# Patient Record
Sex: MALE | Race: WHITE | NOT HISPANIC OR LATINO | Employment: OTHER | URBAN - METROPOLITAN AREA
[De-identification: names, ages, dates, MRNs, and addresses within clinical notes are randomized per-mention and may not be internally consistent; named-entity substitution may affect disease eponyms.]

---

## 2021-01-15 DIAGNOSIS — Z23 NEED FOR VACCINATION: ICD-10-CM

## 2022-04-12 ENCOUNTER — PRE-ADMISSION TESTING (OUTPATIENT)
Dept: ADMISSIONS | Facility: MEDICAL CENTER | Age: 82
End: 2022-04-12
Attending: NEUROLOGICAL SURGERY
Payer: MEDICARE

## 2022-04-12 ENCOUNTER — HOSPITAL ENCOUNTER (OUTPATIENT)
Dept: RADIOLOGY | Facility: MEDICAL CENTER | Age: 82
End: 2022-04-12
Attending: NEUROLOGICAL SURGERY | Admitting: NEUROLOGICAL SURGERY
Payer: MEDICARE

## 2022-04-12 DIAGNOSIS — Z01.812 PRE-PROCEDURAL LABORATORY EXAMINATION: ICD-10-CM

## 2022-04-12 DIAGNOSIS — Z01.811 PRE-PROCEDURAL RESPIRATORY EXAMINATION: ICD-10-CM

## 2022-04-12 DIAGNOSIS — Z01.810 PRE-PROCEDURAL CARDIOVASCULAR EXAMINATION: ICD-10-CM

## 2022-04-12 LAB
ANION GAP SERPL CALC-SCNC: 11 MMOL/L (ref 7–16)
APTT PPP: 26.9 SEC (ref 24.7–36)
BASOPHILS # BLD AUTO: 1.2 % (ref 0–1.8)
BASOPHILS # BLD: 0.11 K/UL (ref 0–0.12)
BUN SERPL-MCNC: 19 MG/DL (ref 8–22)
CALCIUM SERPL-MCNC: 8.9 MG/DL (ref 8.5–10.5)
CHLORIDE SERPL-SCNC: 104 MMOL/L (ref 96–112)
CO2 SERPL-SCNC: 23 MMOL/L (ref 20–33)
CREAT SERPL-MCNC: 1.24 MG/DL (ref 0.5–1.4)
EKG IMPRESSION: NORMAL
EOSINOPHIL # BLD AUTO: 0.3 K/UL (ref 0–0.51)
EOSINOPHIL NFR BLD: 3.3 % (ref 0–6.9)
ERYTHROCYTE [DISTWIDTH] IN BLOOD BY AUTOMATED COUNT: 46.6 FL (ref 35.9–50)
GFR SERPLBLD CREATININE-BSD FMLA CKD-EPI: 57 ML/MIN/1.73 M 2
GLUCOSE SERPL-MCNC: 91 MG/DL (ref 65–99)
HCT VFR BLD AUTO: 42 % (ref 42–52)
HGB BLD-MCNC: 13.1 G/DL (ref 14–18)
IMM GRANULOCYTES # BLD AUTO: 0.03 K/UL (ref 0–0.11)
IMM GRANULOCYTES NFR BLD AUTO: 0.3 % (ref 0–0.9)
INR PPP: 1.01 (ref 0.87–1.13)
LYMPHOCYTES # BLD AUTO: 3.09 K/UL (ref 1–4.8)
LYMPHOCYTES NFR BLD: 33.5 % (ref 22–41)
MCH RBC QN AUTO: 27.4 PG (ref 27–33)
MCHC RBC AUTO-ENTMCNC: 31.2 G/DL (ref 33.7–35.3)
MCV RBC AUTO: 87.9 FL (ref 81.4–97.8)
MONOCYTES # BLD AUTO: 0.78 K/UL (ref 0–0.85)
MONOCYTES NFR BLD AUTO: 8.5 % (ref 0–13.4)
NEUTROPHILS # BLD AUTO: 4.92 K/UL (ref 1.82–7.42)
NEUTROPHILS NFR BLD: 53.2 % (ref 44–72)
NRBC # BLD AUTO: 0 K/UL
NRBC BLD-RTO: 0 /100 WBC
PLATELET # BLD AUTO: 289 K/UL (ref 164–446)
PMV BLD AUTO: 11.4 FL (ref 9–12.9)
POTASSIUM SERPL-SCNC: 4.6 MMOL/L (ref 3.6–5.5)
PROTHROMBIN TIME: 13 SEC (ref 12–14.6)
RBC # BLD AUTO: 4.78 M/UL (ref 4.7–6.1)
SODIUM SERPL-SCNC: 138 MMOL/L (ref 135–145)
WBC # BLD AUTO: 9.2 K/UL (ref 4.8–10.8)

## 2022-04-12 PROCEDURE — 36415 COLL VENOUS BLD VENIPUNCTURE: CPT

## 2022-04-12 PROCEDURE — 85730 THROMBOPLASTIN TIME PARTIAL: CPT

## 2022-04-12 PROCEDURE — 93005 ELECTROCARDIOGRAM TRACING: CPT

## 2022-04-12 PROCEDURE — 85025 COMPLETE CBC W/AUTO DIFF WBC: CPT

## 2022-04-12 PROCEDURE — 71046 X-RAY EXAM CHEST 2 VIEWS: CPT

## 2022-04-12 PROCEDURE — 93010 ELECTROCARDIOGRAM REPORT: CPT | Performed by: INTERNAL MEDICINE

## 2022-04-12 PROCEDURE — 80048 BASIC METABOLIC PNL TOTAL CA: CPT

## 2022-04-12 PROCEDURE — 85610 PROTHROMBIN TIME: CPT

## 2022-04-12 RX ORDER — LOSARTAN POTASSIUM 50 MG/1
50 TABLET ORAL EVERY MORNING
COMMUNITY
Start: 2022-02-01

## 2022-04-12 RX ORDER — ACETAMINOPHEN 500 MG
500-1000 TABLET ORAL EVERY 6 HOURS PRN
Status: ON HOLD | COMMUNITY
End: 2022-04-20

## 2022-04-19 ENCOUNTER — ANESTHESIA (OUTPATIENT)
Dept: SURGERY | Facility: MEDICAL CENTER | Age: 82
End: 2022-04-19
Payer: MEDICARE

## 2022-04-19 ENCOUNTER — ANESTHESIA EVENT (OUTPATIENT)
Dept: SURGERY | Facility: MEDICAL CENTER | Age: 82
End: 2022-04-19
Payer: MEDICARE

## 2022-04-19 ENCOUNTER — APPOINTMENT (OUTPATIENT)
Dept: RADIOLOGY | Facility: MEDICAL CENTER | Age: 82
End: 2022-04-19
Attending: NEUROLOGICAL SURGERY
Payer: MEDICARE

## 2022-04-19 ENCOUNTER — HOSPITAL ENCOUNTER (OUTPATIENT)
Facility: MEDICAL CENTER | Age: 82
End: 2022-04-20
Attending: NEUROLOGICAL SURGERY | Admitting: NEUROLOGICAL SURGERY
Payer: MEDICARE

## 2022-04-19 PROBLEM — M48.061 LUMBAR STENOSIS WITHOUT NEUROGENIC CLAUDICATION: Status: ACTIVE | Noted: 2022-04-19

## 2022-04-19 LAB — PATHOLOGY CONSULT NOTE: NORMAL

## 2022-04-19 PROCEDURE — 160029 HCHG SURGERY MINUTES - 1ST 30 MINS LEVEL 4: Performed by: NEUROLOGICAL SURGERY

## 2022-04-19 PROCEDURE — G0378 HOSPITAL OBSERVATION PER HR: HCPCS

## 2022-04-19 PROCEDURE — A9270 NON-COVERED ITEM OR SERVICE: HCPCS | Performed by: NURSE PRACTITIONER

## 2022-04-19 PROCEDURE — 96376 TX/PRO/DX INJ SAME DRUG ADON: CPT

## 2022-04-19 PROCEDURE — 700101 HCHG RX REV CODE 250: Performed by: ANESTHESIOLOGY

## 2022-04-19 PROCEDURE — 700111 HCHG RX REV CODE 636 W/ 250 OVERRIDE (IP): Performed by: ANESTHESIOLOGY

## 2022-04-19 PROCEDURE — 72100 X-RAY EXAM L-S SPINE 2/3 VWS: CPT

## 2022-04-19 PROCEDURE — 501838 HCHG SUTURE GENERAL: Performed by: NEUROLOGICAL SURGERY

## 2022-04-19 PROCEDURE — 00630 ANES PX LUMBAR REGION NOS: CPT | Performed by: ANESTHESIOLOGY

## 2022-04-19 PROCEDURE — 700102 HCHG RX REV CODE 250 W/ 637 OVERRIDE(OP): Performed by: NURSE PRACTITIONER

## 2022-04-19 PROCEDURE — 96366 THER/PROPH/DIAG IV INF ADDON: CPT

## 2022-04-19 PROCEDURE — L8699 PROSTHETIC IMPLANT NOS: HCPCS | Performed by: NEUROLOGICAL SURGERY

## 2022-04-19 PROCEDURE — 88307 TISSUE EXAM BY PATHOLOGIST: CPT

## 2022-04-19 PROCEDURE — 160041 HCHG SURGERY MINUTES - EA ADDL 1 MIN LEVEL 4: Performed by: NEUROLOGICAL SURGERY

## 2022-04-19 PROCEDURE — 88311 DECALCIFY TISSUE: CPT

## 2022-04-19 PROCEDURE — 96367 TX/PROPH/DG ADDL SEQ IV INF: CPT

## 2022-04-19 PROCEDURE — 700102 HCHG RX REV CODE 250 W/ 637 OVERRIDE(OP): Performed by: ANESTHESIOLOGY

## 2022-04-19 PROCEDURE — 96375 TX/PRO/DX INJ NEW DRUG ADDON: CPT

## 2022-04-19 PROCEDURE — 88304 TISSUE EXAM BY PATHOLOGIST: CPT

## 2022-04-19 PROCEDURE — 160009 HCHG ANES TIME/MIN: Performed by: NEUROLOGICAL SURGERY

## 2022-04-19 PROCEDURE — 700105 HCHG RX REV CODE 258: Performed by: ANESTHESIOLOGY

## 2022-04-19 PROCEDURE — 110371 HCHG SHELL REV 272: Performed by: NEUROLOGICAL SURGERY

## 2022-04-19 PROCEDURE — A9270 NON-COVERED ITEM OR SERVICE: HCPCS | Performed by: ANESTHESIOLOGY

## 2022-04-19 PROCEDURE — 160035 HCHG PACU - 1ST 60 MINS PHASE I: Performed by: NEUROLOGICAL SURGERY

## 2022-04-19 PROCEDURE — C1894 INTRO/SHEATH, NON-LASER: HCPCS | Performed by: NEUROLOGICAL SURGERY

## 2022-04-19 PROCEDURE — 700105 HCHG RX REV CODE 258: Performed by: NEUROLOGICAL SURGERY

## 2022-04-19 PROCEDURE — 99100 ANES PT EXTEME AGE<1 YR&>70: CPT | Performed by: ANESTHESIOLOGY

## 2022-04-19 PROCEDURE — 700111 HCHG RX REV CODE 636 W/ 250 OVERRIDE (IP): Performed by: NEUROLOGICAL SURGERY

## 2022-04-19 PROCEDURE — 700111 HCHG RX REV CODE 636 W/ 250 OVERRIDE (IP): Performed by: NURSE PRACTITIONER

## 2022-04-19 PROCEDURE — 160048 HCHG OR STATISTICAL LEVEL 1-5: Performed by: NEUROLOGICAL SURGERY

## 2022-04-19 PROCEDURE — 500885 HCHG PACK, JACKSON TABLE: Performed by: NEUROLOGICAL SURGERY

## 2022-04-19 PROCEDURE — 96365 THER/PROPH/DIAG IV INF INIT: CPT

## 2022-04-19 PROCEDURE — 700117 HCHG RX CONTRAST REV CODE 255: Performed by: NEUROLOGICAL SURGERY

## 2022-04-19 PROCEDURE — 160036 HCHG PACU - EA ADDL 30 MINS PHASE I: Performed by: NEUROLOGICAL SURGERY

## 2022-04-19 PROCEDURE — 160002 HCHG RECOVERY MINUTES (STAT): Performed by: NEUROLOGICAL SURGERY

## 2022-04-19 PROCEDURE — 700101 HCHG RX REV CODE 250: Performed by: NURSE PRACTITIONER

## 2022-04-19 PROCEDURE — 502240 HCHG MISC OR SUPPLY RC 0272: Performed by: NEUROLOGICAL SURGERY

## 2022-04-19 PROCEDURE — 110454 HCHG SHELL REV 250: Performed by: NEUROLOGICAL SURGERY

## 2022-04-19 PROCEDURE — 700101 HCHG RX REV CODE 250: Performed by: NEUROLOGICAL SURGERY

## 2022-04-19 DEVICE — DURASEAL SEALANT SYSTEM 5ML - (5/BX): Type: IMPLANTABLE DEVICE | Site: SPINE LUMBAR | Status: FUNCTIONAL

## 2022-04-19 DEVICE — BONE CEMENT & MIXER FOR KYPHO: Type: IMPLANTABLE DEVICE | Site: SPINE LUMBAR | Status: FUNCTIONAL

## 2022-04-19 RX ORDER — OXYCODONE HCL 5 MG/5 ML
10 SOLUTION, ORAL ORAL
Status: COMPLETED | OUTPATIENT
Start: 2022-04-19 | End: 2022-04-19

## 2022-04-19 RX ORDER — LABETALOL HYDROCHLORIDE 5 MG/ML
5 INJECTION, SOLUTION INTRAVENOUS
Status: COMPLETED | OUTPATIENT
Start: 2022-04-19 | End: 2022-04-19

## 2022-04-19 RX ORDER — DIPHENHYDRAMINE HYDROCHLORIDE 50 MG/ML
25 INJECTION INTRAMUSCULAR; INTRAVENOUS EVERY 6 HOURS PRN
Status: DISCONTINUED | OUTPATIENT
Start: 2022-04-19 | End: 2022-04-20 | Stop reason: HOSPADM

## 2022-04-19 RX ORDER — CEFAZOLIN SODIUM 1 G/3ML
INJECTION, POWDER, FOR SOLUTION INTRAMUSCULAR; INTRAVENOUS PRN
Status: DISCONTINUED | OUTPATIENT
Start: 2022-04-19 | End: 2022-04-19 | Stop reason: SURG

## 2022-04-19 RX ORDER — POLYETHYLENE GLYCOL 3350 17 G/17G
1 POWDER, FOR SOLUTION ORAL 2 TIMES DAILY PRN
Status: DISCONTINUED | OUTPATIENT
Start: 2022-04-19 | End: 2022-04-20 | Stop reason: HOSPADM

## 2022-04-19 RX ORDER — ONDANSETRON 2 MG/ML
INJECTION INTRAMUSCULAR; INTRAVENOUS PRN
Status: DISCONTINUED | OUTPATIENT
Start: 2022-04-19 | End: 2022-04-19 | Stop reason: SURG

## 2022-04-19 RX ORDER — HYDROMORPHONE HYDROCHLORIDE 1 MG/ML
0.1 INJECTION, SOLUTION INTRAMUSCULAR; INTRAVENOUS; SUBCUTANEOUS
Status: DISCONTINUED | OUTPATIENT
Start: 2022-04-19 | End: 2022-04-19 | Stop reason: HOSPADM

## 2022-04-19 RX ORDER — LIDOCAINE HYDROCHLORIDE 20 MG/ML
INJECTION, SOLUTION EPIDURAL; INFILTRATION; INTRACAUDAL; PERINEURAL PRN
Status: DISCONTINUED | OUTPATIENT
Start: 2022-04-19 | End: 2022-04-19 | Stop reason: SURG

## 2022-04-19 RX ORDER — DEXAMETHASONE SODIUM PHOSPHATE 4 MG/ML
INJECTION, SOLUTION INTRA-ARTICULAR; INTRALESIONAL; INTRAMUSCULAR; INTRAVENOUS; SOFT TISSUE PRN
Status: DISCONTINUED | OUTPATIENT
Start: 2022-04-19 | End: 2022-04-19 | Stop reason: SURG

## 2022-04-19 RX ORDER — ENEMA 19; 7 G/133ML; G/133ML
1 ENEMA RECTAL
Status: DISCONTINUED | OUTPATIENT
Start: 2022-04-19 | End: 2022-04-20 | Stop reason: HOSPADM

## 2022-04-19 RX ORDER — BUPIVACAINE HYDROCHLORIDE AND EPINEPHRINE 5; 5 MG/ML; UG/ML
INJECTION, SOLUTION PERINEURAL
Status: DISCONTINUED | OUTPATIENT
Start: 2022-04-19 | End: 2022-04-19 | Stop reason: HOSPADM

## 2022-04-19 RX ORDER — CEFAZOLIN SODIUM 2 G/100ML
2 INJECTION, SOLUTION INTRAVENOUS EVERY 8 HOURS
Status: COMPLETED | OUTPATIENT
Start: 2022-04-19 | End: 2022-04-20

## 2022-04-19 RX ORDER — ROCURONIUM BROMIDE 10 MG/ML
INJECTION, SOLUTION INTRAVENOUS PRN
Status: DISCONTINUED | OUTPATIENT
Start: 2022-04-19 | End: 2022-04-19 | Stop reason: SURG

## 2022-04-19 RX ORDER — DOCUSATE SODIUM 100 MG/1
100 CAPSULE, LIQUID FILLED ORAL 2 TIMES DAILY
Status: DISCONTINUED | OUTPATIENT
Start: 2022-04-19 | End: 2022-04-20 | Stop reason: HOSPADM

## 2022-04-19 RX ORDER — ONDANSETRON 2 MG/ML
4 INJECTION INTRAMUSCULAR; INTRAVENOUS EVERY 4 HOURS PRN
Status: DISCONTINUED | OUTPATIENT
Start: 2022-04-19 | End: 2022-04-20 | Stop reason: HOSPADM

## 2022-04-19 RX ORDER — HYDROMORPHONE HYDROCHLORIDE 1 MG/ML
0.2 INJECTION, SOLUTION INTRAMUSCULAR; INTRAVENOUS; SUBCUTANEOUS
Status: DISCONTINUED | OUTPATIENT
Start: 2022-04-19 | End: 2022-04-19 | Stop reason: HOSPADM

## 2022-04-19 RX ORDER — SODIUM CHLORIDE, SODIUM LACTATE, POTASSIUM CHLORIDE, CALCIUM CHLORIDE 600; 310; 30; 20 MG/100ML; MG/100ML; MG/100ML; MG/100ML
INJECTION, SOLUTION INTRAVENOUS CONTINUOUS
Status: DISCONTINUED | OUTPATIENT
Start: 2022-04-19 | End: 2022-04-19 | Stop reason: HOSPADM

## 2022-04-19 RX ORDER — CEFAZOLIN SODIUM 1 G/3ML
INJECTION, POWDER, FOR SOLUTION INTRAMUSCULAR; INTRAVENOUS
Status: DISCONTINUED | OUTPATIENT
Start: 2022-04-19 | End: 2022-04-19 | Stop reason: HOSPADM

## 2022-04-19 RX ORDER — AMOXICILLIN 250 MG
1 CAPSULE ORAL NIGHTLY
Status: DISCONTINUED | OUTPATIENT
Start: 2022-04-19 | End: 2022-04-20 | Stop reason: HOSPADM

## 2022-04-19 RX ORDER — ONDANSETRON 4 MG/1
4 TABLET, ORALLY DISINTEGRATING ORAL EVERY 4 HOURS PRN
Status: DISCONTINUED | OUTPATIENT
Start: 2022-04-19 | End: 2022-04-20 | Stop reason: HOSPADM

## 2022-04-19 RX ORDER — BISACODYL 10 MG
10 SUPPOSITORY, RECTAL RECTAL
Status: DISCONTINUED | OUTPATIENT
Start: 2022-04-19 | End: 2022-04-20 | Stop reason: HOSPADM

## 2022-04-19 RX ORDER — DIAZEPAM 5 MG/1
5 TABLET ORAL EVERY 4 HOURS PRN
Status: DISCONTINUED | OUTPATIENT
Start: 2022-04-19 | End: 2022-04-20 | Stop reason: HOSPADM

## 2022-04-19 RX ORDER — HALOPERIDOL 5 MG/ML
1 INJECTION INTRAMUSCULAR
Status: DISCONTINUED | OUTPATIENT
Start: 2022-04-19 | End: 2022-04-19 | Stop reason: HOSPADM

## 2022-04-19 RX ORDER — LOSARTAN POTASSIUM 50 MG/1
50 TABLET ORAL EVERY MORNING
Status: DISCONTINUED | OUTPATIENT
Start: 2022-04-19 | End: 2022-04-20 | Stop reason: HOSPADM

## 2022-04-19 RX ORDER — METHOCARBAMOL 750 MG/1
750 TABLET, FILM COATED ORAL EVERY 8 HOURS PRN
Status: DISCONTINUED | OUTPATIENT
Start: 2022-04-19 | End: 2022-04-20 | Stop reason: HOSPADM

## 2022-04-19 RX ORDER — LABETALOL HYDROCHLORIDE 5 MG/ML
10 INJECTION, SOLUTION INTRAVENOUS
Status: DISCONTINUED | OUTPATIENT
Start: 2022-04-19 | End: 2022-04-20 | Stop reason: HOSPADM

## 2022-04-19 RX ORDER — HYDRALAZINE HYDROCHLORIDE 20 MG/ML
5 INJECTION INTRAMUSCULAR; INTRAVENOUS
Status: DISCONTINUED | OUTPATIENT
Start: 2022-04-19 | End: 2022-04-19 | Stop reason: HOSPADM

## 2022-04-19 RX ORDER — SODIUM CHLORIDE, SODIUM LACTATE, POTASSIUM CHLORIDE, CALCIUM CHLORIDE 600; 310; 30; 20 MG/100ML; MG/100ML; MG/100ML; MG/100ML
INJECTION, SOLUTION INTRAVENOUS
Status: DISCONTINUED | OUTPATIENT
Start: 2022-04-19 | End: 2022-04-19 | Stop reason: SURG

## 2022-04-19 RX ORDER — CYCLOBENZAPRINE HCL 10 MG
10 TABLET ORAL EVERY 8 HOURS PRN
Status: DISCONTINUED | OUTPATIENT
Start: 2022-04-19 | End: 2022-04-20 | Stop reason: HOSPADM

## 2022-04-19 RX ORDER — SODIUM CHLORIDE AND POTASSIUM CHLORIDE 150; 900 MG/100ML; MG/100ML
INJECTION, SOLUTION INTRAVENOUS CONTINUOUS
Status: DISCONTINUED | OUTPATIENT
Start: 2022-04-19 | End: 2022-04-20

## 2022-04-19 RX ORDER — AMOXICILLIN 250 MG
1 CAPSULE ORAL
Status: DISCONTINUED | OUTPATIENT
Start: 2022-04-19 | End: 2022-04-20 | Stop reason: HOSPADM

## 2022-04-19 RX ORDER — HYDROMORPHONE HYDROCHLORIDE 1 MG/ML
0.4 INJECTION, SOLUTION INTRAMUSCULAR; INTRAVENOUS; SUBCUTANEOUS
Status: DISCONTINUED | OUTPATIENT
Start: 2022-04-19 | End: 2022-04-19 | Stop reason: HOSPADM

## 2022-04-19 RX ORDER — ONDANSETRON 2 MG/ML
4 INJECTION INTRAMUSCULAR; INTRAVENOUS
Status: DISCONTINUED | OUTPATIENT
Start: 2022-04-19 | End: 2022-04-19 | Stop reason: HOSPADM

## 2022-04-19 RX ORDER — OXYCODONE HCL 5 MG/5 ML
5 SOLUTION, ORAL ORAL
Status: COMPLETED | OUTPATIENT
Start: 2022-04-19 | End: 2022-04-19

## 2022-04-19 RX ORDER — DIPHENHYDRAMINE HCL 25 MG
25 TABLET ORAL EVERY 6 HOURS PRN
Status: DISCONTINUED | OUTPATIENT
Start: 2022-04-19 | End: 2022-04-20 | Stop reason: HOSPADM

## 2022-04-19 RX ORDER — MEPERIDINE HYDROCHLORIDE 25 MG/ML
12.5 INJECTION INTRAMUSCULAR; INTRAVENOUS; SUBCUTANEOUS
Status: DISCONTINUED | OUTPATIENT
Start: 2022-04-19 | End: 2022-04-19 | Stop reason: HOSPADM

## 2022-04-19 RX ORDER — SODIUM CHLORIDE, SODIUM LACTATE, POTASSIUM CHLORIDE, CALCIUM CHLORIDE 600; 310; 30; 20 MG/100ML; MG/100ML; MG/100ML; MG/100ML
INJECTION, SOLUTION INTRAVENOUS CONTINUOUS
Status: ACTIVE | OUTPATIENT
Start: 2022-04-19 | End: 2022-04-19

## 2022-04-19 RX ADMIN — POTASSIUM CHLORIDE AND SODIUM CHLORIDE: 900; 150 INJECTION, SOLUTION INTRAVENOUS at 13:19

## 2022-04-19 RX ADMIN — CEFAZOLIN SODIUM 2 G: 2 INJECTION, SOLUTION INTRAVENOUS at 17:19

## 2022-04-19 RX ADMIN — OXYCODONE HYDROCHLORIDE 10 MG: 5 SOLUTION ORAL at 11:13

## 2022-04-19 RX ADMIN — ONDANSETRON 4 MG: 2 INJECTION INTRAMUSCULAR; INTRAVENOUS at 08:15

## 2022-04-19 RX ADMIN — CEFAZOLIN SODIUM 2 G: 2 INJECTION, SOLUTION INTRAVENOUS at 23:45

## 2022-04-19 RX ADMIN — CEFAZOLIN 2 G: 330 INJECTION, POWDER, FOR SOLUTION INTRAMUSCULAR; INTRAVENOUS at 08:15

## 2022-04-19 RX ADMIN — SODIUM CHLORIDE, POTASSIUM CHLORIDE, SODIUM LACTATE AND CALCIUM CHLORIDE: 600; 310; 30; 20 INJECTION, SOLUTION INTRAVENOUS at 08:12

## 2022-04-19 RX ADMIN — FENTANYL CITRATE 50 MCG: 50 INJECTION, SOLUTION INTRAMUSCULAR; INTRAVENOUS at 11:20

## 2022-04-19 RX ADMIN — DEXAMETHASONE SODIUM PHOSPHATE 8 MG: 4 INJECTION, SOLUTION INTRA-ARTICULAR; INTRALESIONAL; INTRAMUSCULAR; INTRAVENOUS; SOFT TISSUE at 08:15

## 2022-04-19 RX ADMIN — HYDROMORPHONE HYDROCHLORIDE 0.4 MG: 1 INJECTION, SOLUTION INTRAMUSCULAR; INTRAVENOUS; SUBCUTANEOUS at 11:25

## 2022-04-19 RX ADMIN — FENTANYL CITRATE 50 MCG: 50 INJECTION, SOLUTION INTRAMUSCULAR; INTRAVENOUS at 10:49

## 2022-04-19 RX ADMIN — ROCURONIUM BROMIDE 50 MG: 10 INJECTION, SOLUTION INTRAVENOUS at 08:15

## 2022-04-19 RX ADMIN — LABETALOL HYDROCHLORIDE 5 MG: 5 INJECTION INTRAVENOUS at 10:49

## 2022-04-19 RX ADMIN — FENTANYL CITRATE 50 MCG: 50 INJECTION, SOLUTION INTRAMUSCULAR; INTRAVENOUS at 08:28

## 2022-04-19 RX ADMIN — DOCUSATE SODIUM 100 MG: 100 CAPSULE, LIQUID FILLED ORAL at 17:19

## 2022-04-19 RX ADMIN — POTASSIUM CHLORIDE AND SODIUM CHLORIDE: 900; 150 INJECTION, SOLUTION INTRAVENOUS at 23:45

## 2022-04-19 RX ADMIN — FENTANYL CITRATE 50 MCG: 50 INJECTION, SOLUTION INTRAMUSCULAR; INTRAVENOUS at 10:25

## 2022-04-19 RX ADMIN — ONDANSETRON 4 MG: 2 INJECTION INTRAMUSCULAR; INTRAVENOUS at 13:13

## 2022-04-19 RX ADMIN — LIDOCAINE HYDROCHLORIDE 100 MG: 20 INJECTION, SOLUTION EPIDURAL; INFILTRATION; INTRACAUDAL at 08:15

## 2022-04-19 RX ADMIN — PROPOFOL 200 MG: 10 INJECTION, EMULSION INTRAVENOUS at 08:15

## 2022-04-19 RX ADMIN — LABETALOL HYDROCHLORIDE 5 MG: 5 INJECTION INTRAVENOUS at 10:51

## 2022-04-19 RX ADMIN — ONDANSETRON 4 MG: 2 INJECTION INTRAMUSCULAR; INTRAVENOUS at 17:20

## 2022-04-19 RX ADMIN — FENTANYL CITRATE 50 MCG: 50 INJECTION, SOLUTION INTRAMUSCULAR; INTRAVENOUS at 09:14

## 2022-04-19 RX ADMIN — EPHEDRINE SULFATE 10 MG: 50 INJECTION, SOLUTION INTRAVENOUS at 10:01

## 2022-04-19 RX ADMIN — LOSARTAN POTASSIUM 50 MG: 50 TABLET, FILM COATED ORAL at 13:36

## 2022-04-19 RX ADMIN — Medication: at 13:22

## 2022-04-19 RX ADMIN — HYDRALAZINE HYDROCHLORIDE 5 MG: 20 INJECTION INTRAMUSCULAR; INTRAVENOUS at 11:25

## 2022-04-19 RX ADMIN — SODIUM CHLORIDE, POTASSIUM CHLORIDE, SODIUM LACTATE AND CALCIUM CHLORIDE: 600; 310; 30; 20 INJECTION, SOLUTION INTRAVENOUS at 08:16

## 2022-04-19 ASSESSMENT — PAIN DESCRIPTION - PAIN TYPE
TYPE: ACUTE PAIN;SURGICAL PAIN
TYPE: SURGICAL PAIN;CHRONIC PAIN
TYPE: ACUTE PAIN;SURGICAL PAIN
TYPE: CHRONIC PAIN;SURGICAL PAIN
TYPE: SURGICAL PAIN
TYPE: ACUTE PAIN;SURGICAL PAIN

## 2022-04-19 NOTE — ANESTHESIA PREPROCEDURE EVALUATION
Case: [id number] Date/Time: 04/19/22 1015    Procedures:       VERTEBROPLASTY - POSTERIOR STAGE 1 L5      LAMINECTOMY, SPINE, LUMBAR, WITH DISCECTOMY - L4-5 (Right )      DECOMPRESSION, SPINE, LUMBAR - L5-S1 TRANSPEDICULAR    Pre-op diagnosis: SPINAL STENOSIS OF LUMBAR REGION    Location: [facility] OR 05 / SURGERY [facility]    Surgeons: Randal Meza M.D.          Relevant Problems   No relevant active problems       Physical Exam    Airway   Mallampati: II  TM distance: >3 FB  Neck ROM: full       Cardiovascular - normal exam  Rhythm: regular  Rate: normal  (-) murmur     Dental - normal exam           Pulmonary - normal exam  Breath sounds clear to auscultation     Abdominal    Neurological - normal exam                 Anesthesia Plan    ASA 2       Plan - general       Airway plan will be ETT          Induction: intravenous    Postoperative Plan: Postoperative administration of opioids is intended.    Pertinent diagnostic labs and testing reviewed    Informed Consent:    Anesthetic plan and risks discussed with patient.    Use of blood products discussed with: patient whom consented to blood products.

## 2022-04-19 NOTE — OP REPORT
NEUROSURGERY OPERATIVE NOTE  DATE:  9:04 AM 2022    PATIENT NAME:  Angelia Munroe   1940 MRN [id number]      PROCEDURE:  1.  Lumbar 5 percutaneous bone biopsy, kyphoplasty ([facility])    Surgeon:  Jayro Arana MD, PhD  Assistant: None    Anesthesia:  GETA    Diagnosis: Osteoporotic lumbar 5 compression fracture    Indication: 82-year-old male with low back pain.  Imaging demonstrates subacute lumbar 5 compression fracture in setting of osteoporosis.  There is greater than 50% height loss.  Given this, vertebral augmentation and bone biopsy is planned.    Procedure:  The patient was identified in the holding area, and the surgery site marked, consent was obtained.  The patient was brought back to the operating room and intubated by anesthesia service.  2 grams Ancef was administered intravenously.  He was transferred to the OSI operating room table in a prone manner and all pressure points well padded.  Their lumbar region was prepped with hair clipping, chlorhexidine and betadine scrub, and Chloroprep.  Sterile drapes were applied including a layer of Ioban.  The correct vertebral level was identified flouroscopically.  The skin overlying the right L5 pedicle was infiltrated 0.5% Marcaine with epinephrine.  A small incision was created sharply, and a Kyphon introducer trocar advanced through the right lumbar 5 pedicle under fluoroscopic guidance.  The a bone biopsy was obtained.  The balloon was placed into the anteromedial aspect of the lumbar 5 vertebral body.  Access was obtained on the left side in like manner with no bone biopsy obtained, and a drill was used to deepen the path for the balloon.  The balloons were slowly inflated under fluoroscopic guidance.  The balloons were removed, and Kyphon cement slowly instilled into the vertebral body under fluoroscopic guidance.  Good filling of the vertebral body was obtained.  Once the cement had hardened, the introducer trochars were removed.  The  incisions were closed using 4-0 Monocryl suture through the dermis.  Sterile dressing was placed.  Final counts were correct.    FINDINGS: Greater than 50% height loss lumbar 5 vertebral body secondary to osteoporotic compression fracture.  Successful bone biopsy and vertebral augmentation with kyphoplasty  SPECIMEN:  None  DRAINS: None  EBL: Minimal CC  COMPLICATIONS:  None apparent at end of procedure.

## 2022-04-19 NOTE — ANESTHESIA TIME REPORT
Anesthesia Start and Stop Event Times     Date Time Event    4/19/2022 0730 Ready for Procedure     0812 Anesthesia Start     1039 Anesthesia Stop        Responsible Staff  04/19/22    Name Role Begin End    Tobey Gansert, M.D. Anesth 0812 1039        Overtime Reason:  no overtime (within assigned shift)    Comments:

## 2022-04-19 NOTE — OP REPORT
DATE OF SERVICE:  04/19/2022     PREOPERATIVE DIAGNOSES:    1.  L5 compression fracture.  2.  Right L5 radiculopathy secondary to right 4-5 lateral recess stenosis plus   right 5-1 foraminal stenosis.     POSTOPERATIVE DIAGNOSES:    1.  L5 compression fracture.  2.  Right L5 radiculopathy secondary to right 4-5 lateral recess stenosis plus   right 5-1 foraminal stenosis.  3.  Dural tear secondary to inadvertent kyphoplasty needle placement.     OPERATIONS:  STAGE I:   1.  L5 vertebroplasty.     SURGEON:  Jayro Arana MD     STAGE II:  1. Right L4-5 laminotomy, medial facetectomy, decompression of proximal right   5 root.  2.  Suture repair of dural defect.  3.  Microscopic right L5-S1 transpedicular decompression of distal right L5   nerve root.     SURGEON:  Randal Meza MD     ASSISTANT:  RANDY Curry     ANESTHESIA:  General endotracheal.     ANESTHESIOLOGIST:  Tobey Gansert, MD     PREPARATION:  ChloraPrep.     MEDICATIONS:  The patient given Ancef prior to incision.     INDICATIONS:  The patient with back pain and right L5 radicular pain with the   above findings.  The patient was felt to be a candidate for the proposed   procedure to help improve his pain.  The patient understood major risks and   complications such as paralysis relatively rare, small risk of wound   infection, spinal fluid leak, biggest risk of surgery is nonresponse, which is   10-15%, the chance he require another operation 15%.  The patient is   understanding and agreed to proceed and signed consent.     NEED FOR SURGICAL ASSISTANCE:  Surgical assistance required under both loupe   and microscopic magnification, retraction, suction, irrigation, cleaning of   instruments, keep the case moving forward to minimize operative time.     DESCRIPTION OF PROCEDURE:  The patient was brought to the operating room.    Peripheral venous lines in place.  General anesthesia was induced.  The   patient intubated. The patient laid prone  on the OSI table using 6 posts,   pressure points carefully padded.  I doubly prepped the back with ChloraPrep   and marked my incision in the midline. Dr. Arana then came in and did the   draping and the vertebroplasty.  Once the vertebroplasty was completed, which   he will dictate as a separate procedure, I came in and did my portion of the   procedure.  A midline incision was made from L4-S1.  Skin was infiltrated with   local and incised with scalpel using electrocautery dissection deep in the   midline down to and through deep fashion using a subperiosteal dissection.    Paravertebral muscles dissected off spinous processes, lamina of L4 through S1   on the right side.  Levels confirmed with intraoperative fluoroscopy.  Using   Midas Homer drill and AM-8 bit, inferior portion of lamina 4, superior portion   of lamina 5, the medial facet was drilled down to thin shelf of bone.  I could   see the trajectory through the lamina of L5, which seemed a bit medial.  Upon   removing the ligamentum flavum, CSF egress was noticed.  I carefully worked   down to the origin of the 5 root exposed the area of the dural defect.  I then   did a foraminotomy over the 5 root carried of lamina down of 5 more   inferiorly to get exposure of the dural defect.  The dural defect was seen   just medial to the inferior takeoff of the dural sac of the 5 root.  Operating   microscope was brought in and under high power magnification, this was closed   with a single 5-0 Prolene with a muscle patch.  I tried to pass the nerve   probe in the distal foramen, met with resistance.  We then worked laterally.    I removed the lateral pars with the Midas Homer drill. Tip of the superior facet   of S1 and the distal 5 root was identified, then working lateral to medial   with the straight and angled 2 mm Kerrisons, the transpedicular decompression   of the distal 5 root was carried out.  Once this was accomplished, I could   really pass the nerve  probe medial to lateral, lateral to medial underneath   the pars interarticularis, and into the distal soft tissues without further   compromise.  Wound was irrigated with antibiotic irrigation, obtained another   muscle patch, which was placed over the dural repair then a layer of fibrin   glue.  Muscle bleeders controlled with bipolar electrocautery.  Wound again   irrigated with saline, return was clear, no drain was placed.  Deep fascia was   closed with 0 Vicryl, subcutaneous fascia with 2-0 Vicryl, subcuticular layer   closed with 3-0 Vicryl, and skin edges approximated with a running 5-0   Monocryl.  Sterile dressing was placed.  The patient laid supine on the bed.    Carvalho catheter was placed.  Attention was to keep him flat until 10:00 a.m.   tomorrow.  At the time of this dictation, the patient has not been examined   postoperatively.     ESTIMATED BLOOD LOSS:  50 mL.        ______________________________  MD CLEMENTINA YBARRA/ANNABELLE    DD:  04/19/2022 10:45  DT:  04/19/2022 11:45    Job#:  [id number]    CC:Tobey B. Gansert, MD

## 2022-04-19 NOTE — ANESTHESIA POSTPROCEDURE EVALUATION
Patient: Angelia Munroe    Procedure Summary     Date: 04/19/22 Room / Location: [facility] 05 / SURGERY [facility]    Anesthesia Start: 0812 Anesthesia Stop: 1039    Procedures:       VERTEBROPLASTY - POSTERIOR STAGE 1 L5 (Right Spine Lumbar)      LAMINECTOMY, SPINE, LUMBAR, WITH DISCECTOMY - L4-5 (Right Spine Lumbar)      DECOMPRESSION, SPINE, LUMBAR - L5-S1 TRANSPEDICULAR (Right Spine Lumbar) Diagnosis: (SPINAL STENOSIS OF LUMBAR REGION)    Surgeons: Randal Meza M.D. Responsible Provider: Tobey Gansert, M.D.    Anesthesia Type: general ASA Status: 2          Final Anesthesia Type: general  Last vitals  BP   Blood Pressure : (!) 162/79 (Rn notified)    Temp   36 °C (96.8 °F)    Pulse   74   Resp   16    SpO2   96 %      Anesthesia Post Evaluation    Patient location during evaluation: PACU  Patient participation: complete - patient participated  Level of consciousness: awake and alert    Airway patency: patent  Anesthetic complications: no  Cardiovascular status: hemodynamically stable  Respiratory status: acceptable  Hydration status: euvolemic    PONV: none          No complications documented.     Nurse Pain Score: 4 (NPRS)

## 2022-04-19 NOTE — OR NURSING
Report given to Annie  RN via SBAR reviewed orders and patient history, no questions at this time.  Pt alert and oriented, resp even and nonlabored, in NAD, pt has tolerable pain and no nausea at this time. Pt moves all ext, follows commands and verbalized understanding  of poc and discharge/admission. Family notified via text/phone patient is moving to phase II/room. Will con't to monitor until patient has transitioned.  Belongings on bed.

## 2022-04-20 VITALS
OXYGEN SATURATION: 96 % | RESPIRATION RATE: 18 BRPM | BODY MASS INDEX: 21.11 KG/M2 | TEMPERATURE: 99.3 F | DIASTOLIC BLOOD PRESSURE: 66 MMHG | HEART RATE: 90 BPM | HEIGHT: 70 IN | SYSTOLIC BLOOD PRESSURE: 125 MMHG | WEIGHT: 147.49 LBS

## 2022-04-20 LAB
ANION GAP SERPL CALC-SCNC: 10 MMOL/L (ref 7–16)
BUN SERPL-MCNC: 16 MG/DL (ref 8–22)
CALCIUM SERPL-MCNC: 8.4 MG/DL (ref 8.5–10.5)
CHLORIDE SERPL-SCNC: 106 MMOL/L (ref 96–112)
CO2 SERPL-SCNC: 22 MMOL/L (ref 20–33)
CREAT SERPL-MCNC: 1.2 MG/DL (ref 0.5–1.4)
ERYTHROCYTE [DISTWIDTH] IN BLOOD BY AUTOMATED COUNT: 46.6 FL (ref 35.9–50)
GFR SERPLBLD CREATININE-BSD FMLA CKD-EPI: 60 ML/MIN/1.73 M 2
GLUCOSE SERPL-MCNC: 138 MG/DL (ref 65–99)
HCT VFR BLD AUTO: 37.3 % (ref 42–52)
HGB BLD-MCNC: 11.9 G/DL (ref 14–18)
MCH RBC QN AUTO: 27.9 PG (ref 27–33)
MCHC RBC AUTO-ENTMCNC: 31.9 G/DL (ref 33.7–35.3)
MCV RBC AUTO: 87.4 FL (ref 81.4–97.8)
PLATELET # BLD AUTO: 240 K/UL (ref 164–446)
PMV BLD AUTO: 11 FL (ref 9–12.9)
POTASSIUM SERPL-SCNC: 4.7 MMOL/L (ref 3.6–5.5)
RBC # BLD AUTO: 4.27 M/UL (ref 4.7–6.1)
SODIUM SERPL-SCNC: 138 MMOL/L (ref 135–145)
WBC # BLD AUTO: 17.9 K/UL (ref 4.8–10.8)

## 2022-04-20 PROCEDURE — 85027 COMPLETE CBC AUTOMATED: CPT

## 2022-04-20 PROCEDURE — 700111 HCHG RX REV CODE 636 W/ 250 OVERRIDE (IP): Performed by: NURSE PRACTITIONER

## 2022-04-20 PROCEDURE — 80048 BASIC METABOLIC PNL TOTAL CA: CPT

## 2022-04-20 PROCEDURE — A9270 NON-COVERED ITEM OR SERVICE: HCPCS | Performed by: NURSE PRACTITIONER

## 2022-04-20 PROCEDURE — 97161 PT EVAL LOW COMPLEX 20 MIN: CPT

## 2022-04-20 PROCEDURE — 36415 COLL VENOUS BLD VENIPUNCTURE: CPT

## 2022-04-20 PROCEDURE — 700102 HCHG RX REV CODE 250 W/ 637 OVERRIDE(OP): Performed by: NURSE PRACTITIONER

## 2022-04-20 PROCEDURE — 97116 GAIT TRAINING THERAPY: CPT

## 2022-04-20 PROCEDURE — G0378 HOSPITAL OBSERVATION PER HR: HCPCS

## 2022-04-20 PROCEDURE — 96376 TX/PRO/DX INJ SAME DRUG ADON: CPT

## 2022-04-20 PROCEDURE — 51798 US URINE CAPACITY MEASURE: CPT | Performed by: CHIROPRACTOR

## 2022-04-20 PROCEDURE — 700101 HCHG RX REV CODE 250: Performed by: NURSE PRACTITIONER

## 2022-04-20 RX ORDER — OXYCODONE HYDROCHLORIDE AND ACETAMINOPHEN 5; 325 MG/1; MG/1
1-2 TABLET ORAL EVERY 6 HOURS PRN
Status: DISCONTINUED | OUTPATIENT
Start: 2022-04-20 | End: 2022-04-20 | Stop reason: HOSPADM

## 2022-04-20 RX ADMIN — LOSARTAN POTASSIUM 50 MG: 50 TABLET, FILM COATED ORAL at 10:57

## 2022-04-20 RX ADMIN — POTASSIUM CHLORIDE AND SODIUM CHLORIDE: 900; 150 INJECTION, SOLUTION INTRAVENOUS at 09:19

## 2022-04-20 RX ADMIN — OXYCODONE HYDROCHLORIDE AND ACETAMINOPHEN 2 TABLET: 5; 325 TABLET ORAL at 10:58

## 2022-04-20 RX ADMIN — OXYCODONE HYDROCHLORIDE AND ACETAMINOPHEN 2 TABLET: 5; 325 TABLET ORAL at 16:42

## 2022-04-20 RX ADMIN — ONDANSETRON 4 MG: 2 INJECTION INTRAMUSCULAR; INTRAVENOUS at 09:05

## 2022-04-20 ASSESSMENT — COGNITIVE AND FUNCTIONAL STATUS - GENERAL
WALKING IN HOSPITAL ROOM: A LITTLE
CLIMB 3 TO 5 STEPS WITH RAILING: A LITTLE
STANDING UP FROM CHAIR USING ARMS: A LITTLE
TURNING FROM BACK TO SIDE WHILE IN FLAT BAD: A LITTLE
MOBILITY SCORE: 18
MOVING FROM LYING ON BACK TO SITTING ON SIDE OF FLAT BED: A LITTLE
SUGGESTED CMS G CODE MODIFIER MOBILITY: CK
MOVING TO AND FROM BED TO CHAIR: A LITTLE

## 2022-04-20 ASSESSMENT — GAIT ASSESSMENTS
GAIT LEVEL OF ASSIST: SUPERVISED
ASSISTIVE DEVICE: FRONT WHEEL WALKER
DISTANCE (FEET): 50
DEVIATION: SHUFFLED GAIT

## 2022-04-20 ASSESSMENT — PAIN DESCRIPTION - PAIN TYPE
TYPE: ACUTE PAIN;SURGICAL PAIN

## 2022-04-20 NOTE — CARE PLAN
Problem: Pain - Standard  Goal: Alleviation of pain or a reduction in pain to the patient’s comfort goal  Outcome: Progressing     Problem: Knowledge Deficit - Standard  Goal: Patient and family/care givers will demonstrate understanding of plan of care, disease process/condition, diagnostic tests and medications  Outcome: Progressing     Problem: Skin Integrity  Goal: Skin integrity is maintained or improved  Outcome: Progressing   The patient is Stable - Low risk of patient condition declining or worsening         Progress made toward(s) clinical / shift goals:  Report received from day shift RN. Assumed patient care @ 1915. Patient resting comfortably. A+O, VSS, call light and belongings within reach, bed locked and in lowest position. Bedrest maintained. PCA infusing. Unable to take PO meds d/t need to lay flat and swallowing issues. Pt denies pain. Intermittent nausea. Placed on 1L NC while sleeping. Pt calls appropriately, able to make needs known, turns self. Hourly rounding in place.

## 2022-04-20 NOTE — PROGRESS NOTES
Pt voided without difficulty. Bladder scanned showed 12mls in bladder after voiding.   Pt's wife, Ada,  informed about patient discharging today and instructed to  medications at [facility] in [place] today. Ada agrees and verbalizes understanding.  Patient requesting pain medication before leaving. Pain medication not due at this time. Per Maria C Thomas, okay to give pain medication one hour early.   Pt DC via WC with CNA. AVS printed and reviewed with patient. All questions answered. Pt verbalizes understanding of instructions and agrees with plan. Pt verbalizes pain control, denies nausea/vomitting, NO headache at this time and agrees with DC today. IV DC. All personal belongings taken by patient.

## 2022-04-20 NOTE — PROGRESS NOTES
Neurosurgery Progress Note    Subjective:  States back and hip pain, pain down the legs improved  Nauseated.  Hx of esophageal issues, difficulty swallowing flat  Carvaloh, pca  No HA    Exam:  BLE intact- dressing CDI    BP  Min: 101/53  Max: 210/97  Pulse  Av.9  Min: 61  Max: 101  Resp  Av.5  Min: 15  Max: 20  Temp  Av.4 °C (97.6 °F)  Min: 35.9 °C (96.7 °F)  Max: 37.2 °C (99 °F)  SpO2  Av.6 %  Min: 87 %  Max: 98 %    No data recorded    Recent Labs     22   WBC 17.9*   RBC 4.27*   HEMOGLOBIN 11.9*   HEMATOCRIT 37.3*   MCV 87.4   MCH 27.9   MCHC 31.9*   RDW 46.6   PLATELETCT 240   MPV 11.0     Recent Labs     22   SODIUM 138   POTASSIUM 4.7   CHLORIDE 106   CO2 22   GLUCOSE 138*   BUN 16   CREATININE 1.20   CALCIUM 8.4*               Intake/Output                       22 0700 - 22 0659 22 0700 - 22 0659     [id number] 0176-1013 Total 4440-4589 9735-9885 Total                 Intake    I.V.  1500  -- 1500  --  -- --    Volume (mL) (Lactated Ringers) 1500 -- 1500 -- -- --    Total Intake 1500 -- 1500 -- -- --       Output    Urine  150  400 550  --  -- --    Output (mL) (Urethral Catheter 16 Fr.) 150 400 550 -- -- --    Blood  150  -- 150  --  -- --    Est. Blood Loss 150 -- 150 -- -- --    Total Output 300 400 700 -- -- --       Net I/O     1200 -400 800 -- -- --            Intake/Output Summary (Last 24 hours) at 2022 0954  Last data filed at 2022 0400  Gross per 24 hour   Intake 1500 ml   Output 700 ml   Net 800 ml            • oxyCODONE-acetaminophen  1-2 Tablet Q6HRS PRN   • losartan  50 mg QAM   • Pharmacy Consult Request  1 Each PHARMACY TO DOSE   • MD ALERT...DO NOT ADMINISTER NSAIDS or ASPIRIN unless ORDERED By Neurosurgery  1 Each PRN   • docusate sodium  100 mg BID   • senna-docusate  1 Tablet Nightly   • senna-docusate  1 Tablet Q24HRS PRN   • polyethylene glycol/lytes  1 Packet BID PRN   • magnesium hydroxide  30 mL QDAY PRN   •  bisacodyl  10 mg Q24HRS PRN   • sodium phosphate  1 Each Once PRN   • ondansetron  4 mg Q4HRS PRN   • ondansetron  4 mg Q4HRS PRN   • diphenhydrAMINE  25 mg Q6HRS PRN    Or   • diphenhydrAMINE  25 mg Q6HRS PRN   • methocarbamol  750 mg Q8HRS PRN    Or   • cyclobenzaprine  10 mg Q8HRS PRN    Or   • diazePAM  5 mg Q4HRS PRN   • labetalol  10 mg Q HOUR PRN       Assessment and Plan:  POD #1 L5 kypho, R L4-5 laminotomy, L5-S1 TP decompression  Durotomy  Prophylactic anticoagulation: no         Start date/time: tbd    Plan:  Slowly raise HOB, monitor/report postural HA  Dc pca- start orals  If not HA dc [place], pt- ambulate  Nausea control

## 2022-04-20 NOTE — DISCHARGE INSTRUCTIONS
Discharge Instructions    Discharged to home by car with relative. Discharged via wheelchair, hospital escort: Yes.  Special equipment needed: Not Applicable    Be sure to schedule a follow-up appointment with your primary care doctor or any specialists as instructed.   Take medications as prescribed  For any questions or concerns contact PCP    Discharge Plan:        I understand that a diet low in cholesterol, fat, and sodium is recommended for good health. Unless I have been given specific instructions below for another diet, I accept this instruction as my diet prescription.   Other diet: Regular diet    Special Instructions: None    · Is patient discharged on Warfarin / Coumadin?   No       Spinal Stenosis    Spinal stenosis happens when the open space (spinal canal) between the bones of your spine (vertebrae) gets smaller. It is caused by bone pushing into the open spaces of your backbone (spine). This puts pressure on your backbone and the nerves in your backbone. Treatment often focuses on managing any pain and symptoms. In some cases, surgery may be needed.  Follow these instructions at home:  Managing pain, stiffness, and swelling    · Do all exercises and stretches as told by your doctor.  · Stand and sit up straight (use good posture). If you were given a brace or a corset, wear it as told by your doctor.  · Do not do any activities that cause pain. Ask your doctor what activities are safe for you.  · Do not lift anything that is heavier than 10 lb (4.5 kg) or heavier than your doctor tells you.  · Try to stay at a healthy weight. Talk with your doctor if you need help losing weight.  · If directed, put heat on the affected area as often as told by your doctor. Use the heat source that your doctor recommends, such as a moist heat pack or a heating pad.  ? Put a towel between your skin and the heat source.  ? Leave the heat on for 20-30 minutes.  ? Remove the heat if your skin turns bright red. This is  especially important if you are not able to feel pain, heat, or cold. You may have a greater risk of getting burned.  General instructions  · Take over-the-counter and prescription medicines only as told by your doctor.  · Do not use any products that contain nicotine or tobacco, such as cigarettes and e-cigarettes. If you need help quitting, ask your doctor.  · Eat a healthy diet. This includes plenty of fruits and vegetables, whole grains, and low-fat (lean) protein.  · Keep all follow-up visits as told by your doctor. This is important.  Contact a doctor if:  · Your symptoms do not get better.  · Your symptoms get worse.  · You have a fever.  Get help right away if:  · You have new or worse pain in your neck or upper back.  · You have very bad pain that medicine does not control.  · You are dizzy.  · You have vision problems, blurred vision, or double vision.  · You have a very bad headache that is worse when you stand.  · You feel sick to your stomach (nauseous).  · You throw up (vomit).  · You have new or worse numbness or tingling in your back or legs.  · You have pain, redness, swelling, or warmth in your arm or leg.  Summary  · Spinal stenosis happens when the open space (spinal canal) between the bones of your spine gets smaller (narrow).  · Contact a doctor if your symptoms get worse.  · In some cases, surgery may be needed.  This information is not intended to replace advice given to you by your health care provider. Make sure you discuss any questions you have with your health care provider.  Document Released: 04/12/2012 Document Revised: 11/30/2018 Document Reviewed: 11/22/2017  Elsevier Patient Education © 2020 [employer]      Laminectomy - Laminotomy - Discectomy  Your surgeon has decided that a laminectomy (entire lamina removal) or laminotomy (partial lamina removal) is the best treatment for your back problem. These procedures involve removal of bone to relieve pressure on nerve roots. It  allows the surgeon access to parts of the spine where other problems are located. This could be an injured disc (the cartilage-like structures located between the bones of the back). In this surgery your surgeon removes a part of the rolly arch that surrounds your spinal canal. This may be compressing nerve roots. In some cases, the surgeon will remove the disc and fuse (stick together) vertebral bodies (the bones of your back) to make the spine more stable. The type of procedure you will need is usually decided prior to surgery, however modifications may be necessary. The time in surgery depends on the findings in surgery and the procedure necessary to correct the problems.  DISCECTOMY  For people with disc problems, the surgeon removes the portion of the disc that is causing the pressure on the nerve root. Some surgeons perform a micro (small) discectomy, which may require removal of only a small portion of the lamina. A disc nucleus (center) may also be removed either through a needle (percutaneous discectomy) or by injecting an enzyme called chymopapain into the disc. Chymopapain is an enzyme that dissolves the disc. For people with back instability, the surgeon fuses vertebrae that are next to each other with tiny pieces of bone. These are used as bone grafts on the facets, or between the vertebrae. When this heals, the bones will no longer be able to move. These bone chips are often taken from the pelvic bones. Bones and bone grafts grow into one unit, stabilizing the segments of the spinal column.  LET YOUR CAREGIVER KNOW ABOUT:  · Allergies.  · Medicines taken including herbs, eyedrops, over-the-counter medicines, and creams.  · Use of steroids (by mouth or creams).  · Previous problems with anesthetics or numbing medicine.  · Possibility of pregnancy, if this applies.  · History of blood clots (thrombophlebitis).  · History of bleeding or blood problems.  · Previous surgery.  · Other health problems.  RISKS  AND COMPLICATIONS  Your caregiver will discuss possible risks and complications with you before surgery. In addition to the usual risks of anesthesia, other common risks and complications include:  · Blood loss and replacement.  · Temporary increase in pain due to surgery.  · Uncorrected back pain.  · Infection.  · New nerve damage (tingling, numbness, and pain).  BEFORE THE PROCEDURE  · Stop smoking at least 1 week prior to surgery. This lowers risk during surgery.  · Your caregiver may advise that you stop taking certain medicines that may affect the outcome of the surgery and your ability to heal. For example, you may need to stop taking anti-inflammatories, such as aspirin, because of possible bleeding problems. Other medicines may have interactions with anesthesia.  · Tell your caregiver if you have been on steroids for long periods of time. Often, additional steroids are administered intravenously before and during the procedure to prevent complications.  · You should be present 60 minutes prior to your procedure or as directed.  AFTER THE PROCEDURE  After surgery, you will be taken to the recovery area where a nurse will watch and check your progress. Generally, you will be allowed to go home within 1 week barring other problems.  HOME CARE INSTRUCTIONS   · Check the surgical cut (incision) twice a day for signs of infection. Some signs may include a bad smelling, greenish or yellowish discharge from the wound; increased pain or increased redness over the incision site; an opening of the incision; flu-like symptoms; or a temperature above 101.5° F (38.6° C).  · Change your bandages in about 24 to 36 hours following surgery or as directed.  · You may shower once the bandage is removed or as directed. Avoid bathtubs, swimming pools, and hot tubs for 3 weeks or until your incision has healed completely. If you have stitches (sutures) or staples they may be removed 2 to 3 weeks after surgery, or as directed by  your caregiver.  · Follow your caregiver's instructions for activities, exercises, and physical therapy.  · Weight reduction may be beneficial if you are overweight.  · Walking is permitted. You may use a treadmill without an incline. Cut down on activities if you have discomfort. You may also go up and down stairs as tolerated.  · Do not lift anything heavier than 10 to 15 pounds. Avoid bending or twisting at the waist. Always bend your knees.  · Maintain strength and range of motion as instructed.  · No driving is permitted for 2 to 3 weeks, or as directed by your caregiver. You may be a passenger for 20 to 30 minute trips. Lying back in the passenger seat may be more comfortable for you.  · Limit your sitting to 20 to 30 minute intervals. You should lie down or walk in between sitting periods. There are no limitations for sitting in a recliner chair.  · Only take over-the-counter or prescription medicines for pain, discomfort, or fever as directed by your caregiver.  SEEK MEDICAL CARE IF:   · There is increased bleeding (more than a small spot) from the wound.  · You notice redness, swelling, or increasing pain in the wound.  · Pus is coming from the wound.  · An unexplained oral temperature above 102° F (38.9° C) develops.  · You notice a bad smell coming from the wound or dressing.  SEEK IMMEDIATE MEDICAL CARE IF:   · You develop a rash.  · You have difficulty breathing.  · You have any allergic problems.  Document Released: 12/15/2001 Document Revised: 03/11/2013 Document Reviewed: 10/13/2009  ExitCare® Patient Information ©2014 [employer].    Depression / Suicide Risk    As you are discharged from this [facility] facility, it is important to learn how to keep safe from harming yourself.    Recognize the warning signs:  · Abrupt changes in personality, positive or negative- including increase in energy   · Giving away possessions  · Change in eating patterns- significant weight changes-  positive or  negative  · Change in sleeping patterns- unable to sleep or sleeping all the time   · Unwillingness or inability to communicate  · Depression  · Unusual sadness, discouragement and loneliness  · Talk of wanting to die  · Neglect of personal appearance   · Rebelliousness- reckless behavior  · Withdrawal from people/activities they love  · Confusion- inability to concentrate     If you or a loved one observes any of these behaviors or has concerns about self-harm, here's what you can do:  · Talk about it- your feelings and reasons for harming yourself  · Remove any means that you might use to hurt yourself (examples: pills, rope, extension cords, firearm)  · Get professional help from the community (Mental Health, Substance Abuse, psychological counseling)  · Do not be alone:Call your Safe Contact- someone whom you trust who will be there for you.  · Call your local CRISIS HOTLINE 968-5421 or 547-107-4469  · Call your local Children's Mobile Crisis Response Team [place] (710) 768-2664 or www.[employer]  · Call the toll free National Suicide Prevention Hotlines   · National Suicide Prevention Lifeline 985-565-JCFA (6128)  · National Hope Line Network 800-SUICIDE (867-4181)

## 2022-04-20 NOTE — THERAPY
Missed Therapy     Patient Name: Angelia Munroe  Age:  82 y.o., Sex:  male  Medical Record #: [id number]  Today's Date: 4/20/2022    PT order received. Pt currently on bed rest for dural tear. PT evaluation will be held until pt is cleared for OOB mobility and bed rest order has been discontinued    Macrina Goyal PT, DPT

## 2022-04-20 NOTE — DISCHARGE PLANNING
Received Choice form at 1324  Agency/Facility Name: [facility]  Referral not sent per Choice form, pending HH order.    MAK kidd.

## 2022-04-20 NOTE — PROGRESS NOTES
Pt's HOB at 30 degrees per Maria C Thomas. Pt tolerating well, no HA, dizziness or nausea at this time. PCA DC per order. Pt tolerating PO pain medication. Pt educated on POC. All questions answered at this time. Fall precautions in place.

## 2022-04-20 NOTE — THERAPY
Physical Therapy   Initial Evaluation     Patient Name: Angelia Munroe  Age:  82 y.o., Sex:  male  Medical Record #: [id number]  Today's Date: 4/20/2022     Precautions  Precautions: Fall Risk;Spinal / Back Precautions   Comments: no brace    Assessment  Patient is 82 y.o. male POD#1 L5 kyphoplasty, L4-5 laminectomy, L5-S1 TP decompression, and durotomy. Pt was post 24 hours bed rest--no report of HA throughout mobility. Pt provided with spine packet, educated on log roll and spine precautions. Pt completed bed mobility with SPV and cues for log roll. Transfers completed with and without FWW. Improved safety and balance with FWW.    Pt with mild balance deficits noted-- pt attributed to being s/p bed rest. Initially pt willing to use FWW. Cues provided on proper positioning of FWW and safety when turning. Pt able to ambulate household distances with FWW and SPV but reported he will likely not use one at home. Then attempted gait without AD, CGA required and cues for increased CLAYTON to improve balance. Pt educated on need for FWW at dc to prevent falls and home health therapy to work on safety and balance.     Post gait training, pt was able to demonstrate adequate mobility to dc home with FWW and HHPT. No further acute PT needs.    Plan    Recommend Physical Therapy for Evaluation only     DC Equipment Recommendations: Front-Wheel Walker  Discharge Recommendations: Recommend home health for continued physical therapy services (if pt is agreeable)          04/20/22 1233   Vitals   O2 (LPM) 1   O2 Delivery Device Silicone Nasal Cannula   Prior Living Situation   Prior Services Home-Independent   Housing / Facility Mobile Home   Steps Into Home 1   Steps In Home 0   Equipment Owned None   Lives with - Patient's Self Care Capacity Spouse   Prior Level of Functional Mobility   Bed Mobility Independent   Transfer Status Independent   Ambulation Independent   Distance Ambulation (Feet)   (community)   Assistive Devices Used  None   Stairs Independent   Cognition    Level of Consciousness Alert   Comments [place], somewhat receptive   Active ROM Lower Body    Active ROM Lower Body  WDL   Strength Lower Body   Lower Body Strength  WDL   Sensation Lower Body   Lower Extremity Sensation   WDL   Balance Assessment   Sitting Balance (Static) Fair +   Sitting Balance (Dynamic) Fair +   Standing Balance (Static) Fair   Standing Balance (Dynamic) Fair   Weight Shift Sitting Good   Weight Shift Standing Fair   Comments with FWW   Gait Analysis   Gait Level Of Assist Supervised   Assistive Device Front Wheel Walker   Distance (Feet) 50   # of Times Distance was Traveled 2   Deviation Shuffled Gait   # of Stairs Climbed 0   Weight Bearing Status no restrictions   Comments CGA with no AD   Bed Mobility    Supine to Sit Supervised   Scooting Supervised   Rolling Supervised   Comments log roll cues required   Functional Mobility   Sit to Stand Supervised   Bed, Chair, Wheelchair Transfer Supervised   Transfer Method Stand Step   Mobility in room and hallway with and without AD   Education Group   Education Provided Role of Physical Therapist;Spine Precautions;Gait Training   Spine Precautions Patient Response Patient;Acceptance;Explanation;Handout;Verbal Demonstration   Role of Physical Therapist Patient Response Patient;Acceptance;Explanation;Verbal Demonstration   Gait Training Patient Response Patient;Acceptance;Explanation;Verbal Demonstration;Action Demonstration;Reinforcement Needed   Anticipated Discharge Equipment and Recommendations   DC Equipment Recommendations Front-Wheel Walker   Discharge Recommendations Recommend home health for continued physical therapy services  (if pt is agreeable)

## 2022-04-20 NOTE — DISCHARGE PLANNING
Met with patient at bedside, discussed plan for discharge home with  PT, Choices provided with [facility] as preferred agency. Patient declined FWW as his wife has one at home which he can use. Wife to drive patient home and provide care needed.

## 2022-04-20 NOTE — CARE PLAN
Problem: Pain - Standard  Goal: Alleviation of pain or a reduction in pain to the patient’s comfort goal  Outcome: Progressing     Problem: Knowledge Deficit - Standard  Goal: Patient and family/care givers will demonstrate understanding of plan of care, disease process/condition, diagnostic tests and medications  Outcome: Progressing     Problem: Skin Integrity  Goal: Skin integrity is maintained or improved  Outcome: Progressing   The patient is Watcher - Medium risk of patient condition declining or worsening         Progress made toward(s) clinical / shift goals:  Pt on PCA bolus, and verbalizes pain control. Pt educated on bed flat orders for 24hrs. Pt able to move side to side.    Patient is not progressing towards the following goals:

## 2022-04-20 NOTE — PROGRESS NOTES
4 Eyes Skin Assessment Completed by ROLAN Valencia and ROLAN Montero.    Head WDL, redness on cheeks  Ears WDL  Nose WDL  Mouth Redness, dryness  Neck WDL  Breast/Chest WDL  Shoulder Blades WDL  Spine Redness and Blanching, incision site.  (R) Arm/Elbow/Hand Redness and Blanching  (L) Arm/Elbow/Hand Redness and Blanching  Abdomen Scar  Groin WDL  Scrotum/Coccyx/Buttocks Redness and Blanching  (R) Leg WDL  (L) Leg WDL  (R) Heel/Foot/Toe Redness and Blanching  (L) Heel/Foot/Toe Redness and Blanching          Devices In Places Pulse Ox, Carvalho and SCD's      Interventions In Place Pillows    Possible Skin Injury No    Pictures Uploaded Into Epic N/A  Wound Consult Placed N/A  RN Wound Prevention Protocol Ordered No

## 2022-04-20 NOTE — CARE PLAN
Problem: Pain - Standard  Goal: Alleviation of pain or a reduction in pain to the patient’s comfort goal  Outcome: Progressing   Pt   Problem: Knowledge Deficit - Standard  Goal: Patient and family/care givers will demonstrate understanding of plan of care, disease process/condition, diagnostic tests and medications  Outcome: Progressing     Problem: Skin Integrity  Goal: Skin integrity is maintained or improved  Outcome: Progressing   The patient is Stable - Low risk of patient condition declining or worsening         Progress made toward(s) clinical / shift goals:  Pt verbalizes pain control at this time. Pt is able to ambulate with assistance.    Patient is not progressing towards the following goals:

## 2022-04-21 NOTE — DISCHARGE SUMMARY
DATE OF ADMISSION:  04/19/2022   DATE OF DISCHARGE:  04/20/2022     OPERATION PERFORMED:  L5 kyphoplasty with L4-5 laminotomy and L5-S1   transpedicular decompression on 04/19/2022 with known durotomy.     HOSPITAL COURSE:  On date of admission, operation was performed.    Postoperatively, the patient was laid flat.  When seen this morning, he was   quite nauseous.  Antiemetics were given.  We did slowly raise his head of the   bed and he did not have any kind of postural headache.  This afternoon, he is   up in a chair.  He is eating and ambulating.  His pain is controlled.  He does   not have headache.  He would like to go home. If able to void, he will   discharge home.     Bilateral lower extremity strength is grossly intact.  His incision is clean,   dry and intact.     DISCHARGE INSTRUCTIONS:  Given to the patient in paper format.     DISCHARGE MEDICATIONS:  Percocet 5/325 one to two p.o. q.6 hours p.r.n. pain,   #56, no refills.  This was sent to his pharmacy.     Informed consent and medication agreement done over the phone in preop.     ASSESSMENT AND PLAN:  1.  Status post above delineated surgery with Dr. Meza on 4/19/2022.  2.  The patient will follow up in our office in 4 weeks.  3.  The patient will remove Steri-Strips in 14 days.     Should the patient have further questions or concerns, they will not hesitate   to give our office a call.        ______________________________  RANDY Curry        ______________________________  MD YO YBARRA/ANGELA    DD:  04/20/2022 14:47  DT:  04/20/2022 18:37    Job#:  [id number]

## (undated) DEVICE — BOVIE BLADE SHORT - (150EA/CT)

## (undated) DEVICE — TUBING CLEARLINK DUO-VENT - C-FLO (48EA/CA)

## (undated) DEVICE — DRAPE STRLE REG TOWEL 18X24 - (10/BX 4BX/CA)"

## (undated) DEVICE — TUBE E-T HI-LO CUFF 8.0MM (10EA/PK)

## (undated) DEVICE — SUTURE 5-0 PROLENE BV-1 HEMOSEAL (36PK/BX)

## (undated) DEVICE — KIT SURGIFLO W/OUT THROMBIN - (6EA/CA)

## (undated) DEVICE — NEPTUNE 4 PORT MANIFOLD - (20/PK)

## (undated) DEVICE — PACK JACKSON TABLE KIT W/OUT - HR (6EA/CA)

## (undated) DEVICE — TUBING C&T SET FLYING LEADS DRAIN TUBING (10EA/BX)

## (undated) DEVICE — ARMREST CRADLE FOAM - (2PR/PK 12PR/CA)

## (undated) DEVICE — SET LEADWIRE 5 LEAD BEDSIDE DISPOSABLE ECG (1SET OF 5/EA)

## (undated) DEVICE — SUTURE GENERAL

## (undated) DEVICE — PROTECTOR ULNA NERVE - (36PR/CA)

## (undated) DEVICE — SHEET PEDIATRIC LAPAROTOMY - (10/CA)

## (undated) DEVICE — HEADREST PRONEVIEW LARGE - (10/CA)

## (undated) DEVICE — HEAD HOLDER JUNIOR/ADULT

## (undated) DEVICE — LACTATED RINGERS INJ. 500 ML - (24EA/CA)

## (undated) DEVICE — CANISTER SUCTION 3000ML MECHANICAL FILTER AUTO SHUTOFF MEDI-VAC NONSTERILE LF DISP  (40EA/CA)

## (undated) DEVICE — TOWEL STOP TIMEOUT SAFETY FLAG (40EA/CA)

## (undated) DEVICE — BLADE SURGICAL #11 - (50/BX)

## (undated) DEVICE — SUTURE 2-0 VICRYL PLUS CT-1 - 8 X 18 INCH(12/BX)

## (undated) DEVICE — PACK NEURO - (2EA/CA)

## (undated) DEVICE — CLOSURE WOUND 1/4 X 4 (STERI - STRIP) (50/BX 4BX/CA)

## (undated) DEVICE — DRAPE 36X28IN RAD CARM BND BG - (25/CA) O

## (undated) DEVICE — STERI STRIP COMPOUND BENZOIN - TINCTURE 0.6ML WITH APPLICATOR (40EA/BX)

## (undated) DEVICE — CLEANER ELECTRO-SURGICAL TIP - (25/BX 4BX/CA)

## (undated) DEVICE — GOWN SURGEONS X-LARGE - DISP. (30/CA)

## (undated) DEVICE — SET EXTENSION WITH 2 PORTS (48EA/CA) ***PART #2C8610 IS A SUBSTITUTE*****

## (undated) DEVICE — TRAY EXPANDER INFLATABLE BONE TAMP FF 1-STEP WITH CDS KYPHOPAK 20/3

## (undated) DEVICE — TOOL DISSECT MATCH HEAD

## (undated) DEVICE — DRAPE IOBAN II INCISE 23X17 - (10EA/BX 4BX/CA)

## (undated) DEVICE — SUCTION INSTRUMENT YANKAUER BULBOUS TIP W/O VENT (50EA/CA)

## (undated) DEVICE — GLOVE BIOGEL PI ORTHO SZ 7 PF LF (40PR/BX)

## (undated) DEVICE — LACTATED RINGERS INJ 1000 ML - (14EA/CA 60CA/PF)

## (undated) DEVICE — KIT ANESTHESIA W/CIRCUIT & 3/LT BAG W/FILTER (20EA/CA)

## (undated) DEVICE — DRAPE LARGE 3 QUARTER - (20/CA)

## (undated) DEVICE — SLEEVE, VASO, THIGH, MED

## (undated) DEVICE — SUTURE 0 VICRYL PLUS CT-1 - 8 X 18 INCH (12/BX)

## (undated) DEVICE — CHLORAPREP 26 ML APPLICATOR - ORANGE TINT(25/CA)

## (undated) DEVICE — MASK ANESTHESIA ADULT  - (100/CA)

## (undated) DEVICE — SPONGE GAUZESTER 4 X 4 4PLY - (128PK/CA)

## (undated) DEVICE — SYRINGE NON SAFETY 10 CC 20 GA X 1-1/2 IN (100/BX 4BX/CA)

## (undated) DEVICE — ELECTRODE DUAL RETURN W/ CORD - (50/PK)

## (undated) DEVICE — SENSOR SPO2 NEO LNCS ADHESIVE (20/BX) SEE USER NOTES

## (undated) DEVICE — SUTURE 3-0 VICRYL PLUS RB-1 - 8 X 18 INCH (12/BX)

## (undated) DEVICE — BONE FILLER DEVICE KYPHO

## (undated) DEVICE — DRAPE MICROSCOPE ARMATEC 120IN X 46IN (10EA/CA)

## (undated) DEVICE — GOWN WARMING STANDARD FLEX - (30/CA)

## (undated) DEVICE — GLOVE BIOGEL PI ORTHO SZ 7.5 PF LF (40PR/BX)

## (undated) DEVICE — SODIUM CHL IRRIGATION 0.9% 1000ML (12EA/CA)

## (undated) DEVICE — MIDAS LUBRICATOR DIFFUSER PACK (4EA/CA)

## (undated) DEVICE — ELECTRODE 850 FOAM ADHESIVE - HYDROGEL RADIOTRNSPRNT (50/PK)